# Patient Record
Sex: MALE | Race: WHITE | URBAN - METROPOLITAN AREA
[De-identification: names, ages, dates, MRNs, and addresses within clinical notes are randomized per-mention and may not be internally consistent; named-entity substitution may affect disease eponyms.]

---

## 2017-11-06 LAB
CREATININE, EXTERNAL: 0.74
LDL-C, EXTERNAL: 82

## 2018-02-08 ENCOUNTER — OFFICE VISIT (OUTPATIENT)
Dept: ENDOCRINOLOGY | Age: 53
End: 2018-02-08

## 2018-02-08 VITALS
SYSTOLIC BLOOD PRESSURE: 138 MMHG | TEMPERATURE: 96.4 F | WEIGHT: 295.6 LBS | HEIGHT: 76 IN | OXYGEN SATURATION: 100 % | RESPIRATION RATE: 18 BRPM | DIASTOLIC BLOOD PRESSURE: 93 MMHG | BODY MASS INDEX: 36 KG/M2 | HEART RATE: 74 BPM

## 2018-02-08 DIAGNOSIS — E11.65 TYPE 2 DIABETES MELLITUS WITH HYPERGLYCEMIA, UNSPECIFIED LONG TERM INSULIN USE STATUS: Primary | ICD-10-CM

## 2018-02-08 DIAGNOSIS — E78.2 MIXED HYPERLIPIDEMIA: ICD-10-CM

## 2018-02-08 DIAGNOSIS — E11.65 TYPE 2 DIABETES MELLITUS WITH HYPERGLYCEMIA, WITHOUT LONG-TERM CURRENT USE OF INSULIN (HCC): Primary | ICD-10-CM

## 2018-02-08 DIAGNOSIS — I10 HYPERTENSION, ESSENTIAL: ICD-10-CM

## 2018-02-08 LAB
GLUCOSE POC: 364 MG/DL
HBA1C MFR BLD HPLC: 11 %

## 2018-02-08 RX ORDER — ALLOPURINOL 100 MG/1
100 TABLET ORAL DAILY
COMMUNITY
End: 2019-07-17

## 2018-02-08 RX ORDER — METFORMIN HYDROCHLORIDE 500 MG/1
500 TABLET, EXTENDED RELEASE ORAL 2 TIMES DAILY WITH MEALS
Qty: 60 TAB | Refills: 6 | Status: SHIPPED | OUTPATIENT
Start: 2018-02-08 | End: 2018-03-22 | Stop reason: DRUGHIGH

## 2018-02-08 RX ORDER — HYDROCODONE BITARTRATE AND ACETAMINOPHEN 7.5; 325 MG/1; MG/1
TABLET ORAL
COMMUNITY
End: 2019-07-17

## 2018-02-08 RX ORDER — ASPIRIN 325 MG
81 TABLET ORAL DAILY
COMMUNITY
End: 2020-07-27

## 2018-02-08 RX ORDER — GLIPIZIDE 5 MG/1
TABLET ORAL 2 TIMES DAILY
COMMUNITY
End: 2018-08-03

## 2018-02-08 RX ORDER — VALSARTAN 160 MG/1
160 TABLET ORAL DAILY
COMMUNITY

## 2018-02-08 RX ORDER — ATORVASTATIN CALCIUM 40 MG/1
40 TABLET, FILM COATED ORAL
COMMUNITY

## 2018-02-08 RX ORDER — METOPROLOL TARTRATE 50 MG/1
50 TABLET ORAL DAILY
COMMUNITY
End: 2020-07-27 | Stop reason: ALTCHOICE

## 2018-02-08 RX ORDER — LISINOPRIL 10 MG/1
TABLET ORAL DAILY
COMMUNITY
End: 2019-07-17

## 2018-02-08 NOTE — PATIENT INSTRUCTIONS
Check blood sugars before  breakfast and at bedtime. Low blood glucose is less than 70     Maintain the log and bring it all your appointments    If the bedtime sugars are less than 100 ,eat a 15 gm snack. Humulin N 24 units in AM and 14 units at night     After a week Begin Metformin  mg at dinner and take for 2 weeks. If no GI side effects (nausea, diarrhea, belly pain),   Then take 1 tablet with breakfast and 1 tablet with dinner    Stop glipizide       Exercising for 30 minutes at least 5 days per week has been shown to increase the lifespan of diabetics. We encourage an active lifestyle that includes regular exercise. You may benefit from the [facility] at [facility] #[phone]     For diet information go to www. EATRIGHT. org     Diabetes is the leading cause of blindness in the [place] It is important that you see an eye doctor every year for a dilated retinal exam     Diabetes is the leading cause of amputations in the [place] It is very important that you keep an eye on the condition of you feet. Look for any cuts, calluses, ulcers, fungal infections, rashes, or nail problems. Diabetics need to be seen several times a year by their physician for fasting labs and monitoring of their diabetes. Prevention is the key to keeping diabetics out of trouble     Obtain a flu shot each Fall      What should you know about eating carbs? Managing the amount of carbohydrate (carbs) you eat is an important part of healthy meals when you have diabetes. Carbohydrate is found in many foods. · Learn which foods have carbs. And learn the amounts of carbs in different foods. · Bread, cereal, pasta, and rice have about 15 grams of carbs in a serving. A serving is 1 slice of bread (1 ounce), ½ cup of cooked cereal, or 1/3 cup of cooked pasta or rice. · Fruits have 15 grams of carbs in a serving.  A serving is 1 small fresh fruit, such as an apple or orange; ½ of a banana; ½ cup of cooked or canned fruit; ½ cup of fruit juice; 1 cup of melon or raspberries; or 2 tablespoons of dried fruit. · Milk and no-sugar-added yogurt have 15 grams of carbs in a serving. A serving is 1 cup of milk or 2/3 cup of no-sugar-added yogurt. · Starchy vegetables have 15 grams of carbs in a serving. A serving is ½ cup of mashed potatoes or sweet potato; 1 cup winter squash; ½ of a small baked potato; ½ cup of cooked beans; or ½ cup cooked corn or green peas. · Learn how much carbs to eat each day and at each meal. A dietitian or CDE can teach you how to keep track of the amount of carbs you eat. This is called carbohydrate counting. · If you are not sure how to count carbohydrate grams, use the Plate Method to plan meals. It is a good, quick way to make sure that you have a balanced meal. It also helps you spread carbs throughout the day. · Divide your plate by types of foods. Put non-starchy vegetables on half the plate, meat or other protein food on one-quarter of the plate, and a grain or starchy vegetable in the final quarter of the plate. To this you can add a small piece of fruit and 1 cup of milk or yogurt, depending on how many carbs you are supposed to eat at a meal.  · Try to eat about the same amount of carbs at each meal. Do not \"save up\" your daily allowance of carbs to eat at one meal.  · Proteins have very little or no carbs per serving. Examples of proteins are beef, chicken, turkey, fish, eggs, tofu, cheese, cottage cheese, and peanut butter. A serving size of meat is 3 ounces, which is about the size of a deck of cards. Examples of meat substitute serving sizes (equal to 1 ounce of meat) are 1/4 cup of cottage cheese, 1 egg, 1 tablespoon of peanut butter, and ½ cup of tofu. How can you eat out and still eat healthy? · Learn to estimate the serving sizes of foods that have carbohydrate. If you measure food at home, it will be easier to estimate the amount in a serving of restaurant food.   · If you eat more carbohydrate at a meal than you had planned, take a walk or do other exercise. This will help lower your blood sugar.

## 2018-02-08 NOTE — PROGRESS NOTES
Wt Readings from Last 3 Encounters:   02/08/18 295 lb 9.6 oz (134.1 kg)     Temp Readings from Last 3 Encounters:   02/08/18 96.4 °F (35.8 °C)     BP Readings from Last 3 Encounters:   02/08/18 (!) 138/93     Pulse Readings from Last 3 Encounters:   02/08/18 74   1. Have you been to the ER, urgent care clinic since your last visit? Hospitalized since your last visit? No    2. Have you seen or consulted any other health care providers outside of the [address] [place] [address] since your last visit? Include any pap smears or colon screening.  and Noel Hernandez for follow up.     Eye exam: 2017  Foot exam: No

## 2018-02-08 NOTE — MR AVS SNAPSHOT
[address] [facility] [address] 
[phone] Patient: Lenore Jackson MRN: [id number] :1965 Visit Information Date & Time Provider Department Dept. Phone Encounter #  
 2018 10:00 AM Quyen Ennis MD [facility] [phone] [id number] Follow-up Instructions Return in about 6 weeks (around 3/22/2018). Upcoming Health Maintenance Date Due DTaP/Tdap/Td series (1 - Tdap) 1/3/1988 FOBT Q 1 YEAR AGE 50-75 1/3/2017 Influenza Age 5 to Adult 2017 Allergies as of 2018  Review Complete On: 2018 By: Quyen Ennis MD  
  
 Severity Noted Reaction Type Reactions Lisinopril  2018    Cough Metformin  2018    Other (comments) Causes weakness and fatigue Current Immunizations  Never Reviewed No immunizations on file. Not reviewed this visit You Were Diagnosed With   
  
 Codes Comments Type 2 diabetes mellitus with hyperglycemia, without long-term current use of insulin (HCC)    -  Primary ICD-10-CM: E11.65 ICD-9-CM: 250.00, 790.29 Vitals BP Pulse Temp Resp Height(growth percentile) Weight(growth percentile) (!) 138/93 (BP 1 Location: Right arm, BP Patient Position: Sitting) 74 96.4 °F (35.8 °C) 18 6' 4\" (1.93 m) 295 lb 9.6 oz (134.1 kg) SpO2 BMI Smoking Status 100% 35.98 kg/m2 Never Smoker Vitals History BMI and BSA Data Body Mass Index Body Surface Area 35.98 kg/m 2 2.68 m 2 Your Updated Medication List  
  
   
This list is accurate as of: 18 11:17 AM.  Always use your most recent med list.  
  
  
  
  
 allopurinol 100 mg tablet Commonly known as:  Gillian Parker Take  by mouth daily. aspirin 325 mg tablet Generic drug:  aspirin Take 325 mg by mouth daily. atorvastatin 40 mg tablet Commonly known as:  LIPITOR Take  by mouth daily. glipiZIDE 5 mg tablet Commonly known as:  Bere Nestle Take  by mouth two (2) times a day. HYDROcodone-acetaminophen 7.5-325 mg per tablet Commonly known as:  Patsy  Take  by mouth. Take one tablet 3 times daily for pain as needed. lisinopril 10 mg tablet Commonly known as:  Hillary Iam Take  by mouth daily. metoprolol tartrate 25 mg tablet Commonly known as:  LOPRESSOR Take  by mouth daily. valsartan 160 mg tablet Commonly known as:  DIOVAN Take  by mouth daily. We Performed the Following AMB POC GLUCOSE, QUANTITATIVE, BLOOD [83612 CPT(R)] AMB POC HEMOGLOBIN A1C [45625 CPT(R)] Follow-up Instructions Return in about 6 weeks (around 3/22/2018). Patient Instructions Check blood sugars before  breakfast and at bedtime. Low blood glucose is less than 70 Maintain the log and bring it all your appointments If the bedtime sugars are less than 100 ,eat a 15 gm snack. Humulin N 24 units in AM and 14 units at night After a week Begin Metformin  mg at dinner and take for 2 weeks. If no GI side effects (nausea, diarrhea, belly pain), Then take 1 tablet with breakfast and 1 tablet with dinner Stop glipizide Exercising for 30 minutes at least 5 days per week has been shown to increase the lifespan of diabetics. We encourage an active lifestyle that includes regular exercise. You may benefit from the Diabetic Treatment Center at [facility] #[phone] For diet information go to www. EATRIGHT. org  
 
Diabetes is the leading cause of blindness in the [place] It is important that you see an eye doctor every year for a dilated retinal exam  
 
Diabetes is the leading cause of amputations in the [place] It is very important that you keep an eye on the condition of you feet. Look for any cuts, calluses, ulcers, fungal infections, rashes, or nail problems. Diabetics need to be seen several times a year by their physician for fasting labs and monitoring of their diabetes. Prevention is the key to keeping diabetics out of trouble Obtain a flu shot each Fall What should you know about eating carbs? Managing the amount of carbohydrate (carbs) you eat is an important part of healthy meals when you have diabetes. Carbohydrate is found in many foods. · Learn which foods have carbs. And learn the amounts of carbs in different foods. · Bread, cereal, pasta, and rice have about 15 grams of carbs in a serving. A serving is 1 slice of bread (1 ounce), ½ cup of cooked cereal, or 1/3 cup of cooked pasta or rice. · Fruits have 15 grams of carbs in a serving. A serving is 1 small fresh fruit, such as an apple or orange; ½ of a banana; ½ cup of cooked or canned fruit; ½ cup of fruit juice; 1 cup of melon or raspberries; or 2 tablespoons of dried fruit. · Milk and no-sugar-added yogurt have 15 grams of carbs in a serving. A serving is 1 cup of milk or 2/3 cup of no-sugar-added yogurt. · Starchy vegetables have 15 grams of carbs in a serving. A serving is ½ cup of mashed potatoes or sweet potato; 1 cup winter squash; ½ of a small baked potato; ½ cup of cooked beans; or ½ cup cooked corn or green peas. · Learn how much carbs to eat each day and at each meal. A dietitian or CDE can teach you how to keep track of the amount of carbs you eat. This is called carbohydrate counting. · If you are not sure how to count carbohydrate grams, use the Plate Method to plan meals. It is a good, quick way to make sure that you have a balanced meal. It also helps you spread carbs throughout the day. · Divide your plate by types of foods. Put non-starchy vegetables on half the plate, meat or other protein food on one-quarter of the plate, and a grain or starchy vegetable in the final quarter of the plate.  To this you can add a small piece of fruit and 1 cup of milk or yogurt, depending on how many carbs you are supposed to eat at a meal. 
· Try to eat about the same amount of carbs at each meal. Do not \"save up\" your daily allowance of carbs to eat at one meal. 
· Proteins have very little or no carbs per serving. Examples of proteins are beef, chicken, turkey, fish, eggs, tofu, cheese, cottage cheese, and peanut butter. A serving size of meat is 3 ounces, which is about the size of a deck of cards. Examples of meat substitute serving sizes (equal to 1 ounce of meat) are 1/4 cup of cottage cheese, 1 egg, 1 tablespoon of peanut butter, and ½ cup of tofu. How can you eat out and still eat healthy? · Learn to estimate the serving sizes of foods that have carbohydrate. If you measure food at home, it will be easier to estimate the amount in a serving of restaurant food. · If you eat more carbohydrate at a meal than you had planned, take a walk or do other exercise. This will help lower your blood sugar. Introducing \[facility] [place] [facility]\"" & HEALTH SERVICES! [facility] introduces [facility] patient portal. Now you can access parts of your medical record, email your doctor's office, and request medication refills online. 1. In your internet browser, go to [email or web address] 2. Click on the First Time User? Click Here link in the Sign In box. You will see the New Member Sign Up page. 3. Enter your [facility] Access Code exactly as it appears below. You will not need to use this code after youve completed the sign-up process. If you do not sign up before the expiration date, you must request a new code. · [facility] Access Code: [email or web address] Expires: 5/9/2018 11:17 AM 
 
4. Enter the last four digits of your Social Security Number (xxxx) and Date of Birth (mm/dd/yyyy) as indicated and click Submit. You will be taken to the next sign-up page. 5. Create a [facility] ID.  This will be your [facility] login ID and cannot be changed, so think of one that is secure and easy to remember. 6. Create a [facility] password. You can change your password at any time. 7. Enter your Password Reset Question and Answer. This can be used at a later time if you forget your password. 8. Enter your e-mail address. You will receive e-mail notification when new information is available in [address] 9. Click Sign Up. You can now view and download portions of your medical record. 10. Click the Download Summary menu link to download a portable copy of your medical information. If you have questions, please visit the Frequently Asked Questions section of the [facility] website. Remember, [facility] is NOT to be used for urgent needs. For medical emergencies, dial 911. Now available from your iPhone and Android! Please provide this summary of care documentation to your next provider. Your primary care clinician is listed as Donna Claude. If you have any questions after today's visit, please call [phone].

## 2018-02-08 NOTE — PROGRESS NOTES
[facility]               Rafiq Momin MD        [address] [address] NU:[phone] Fax [phone]               Patient Information  Date:2/8/2018  Name : Tammy Diehl 48 y.o.     YOB: 1965         Referred by: Filippo Rivas MD         Chief Complaint   Patient presents with    New Patient     Diabetes       History of Present Illness: Tammy Diehl is a 48 y.o. male here for initial visit of  Type 2 Diabetes Mellitus. Type 2 Diabetes was diagnosed 2017 . End organ effects of diabetes: peripheral neuropathy. He was told to be prediabetic several years ago, was prescribed metformin and it reportedly made him sick. He is on glipizide 5 mg twice daily. A1c in November was more than 10, today it is close to 15  Januvia was expensive, he was also given GLP-1 which he did not take it. No pancreatitis  History of cholectomy 12 inches, post diverticulitis  Was also on Actos, unclear etiology for discontinuation. He is checking blood glucose daily which is showing blood glucose ranging from 300s-400s. Has tingling numbness in the feet, polyuria. He also has hypogonadism and cardiologist discontinue testosterone replacement. He has DJD, scheduled for epidural injection next week. No chest pain or shortness of breath. Diet is very unhealthy, he is on a very high carbohydrate diet and potatoes are his weakness    No regular exercise due to arthritis    Wt Readings from Last 3 Encounters:   02/08/18 295 lb 9.6 oz (134.1 kg)       BP Readings from Last 3 Encounters:   02/08/18 (!) 138/93           Past Medical History:   Diagnosis Date    Arthritis     Diverticulitis     Gout     Heart attack 03/2017    Hypertension     Kidney stones     Type 2 diabetes mellitus (HCC)      Current Outpatient Prescriptions   Medication Sig    metoprolol tartrate (LOPRESSOR) 25 mg tablet Take  by mouth daily.     aspirin (ASPIRIN) 325 mg tablet Take 325 mg by mouth daily.  valsartan (DIOVAN) 160 mg tablet Take  by mouth daily.  atorvastatin (LIPITOR) 40 mg tablet Take  by mouth daily.  allopurinol (ZYLOPRIM) 100 mg tablet Take  by mouth daily.  lisinopril (PRINIVIL, ZESTRIL) 10 mg tablet Take  by mouth daily.  glipiZIDE (GLUCOTROL) 5 mg tablet Take  by mouth two (2) times a day.  HYDROcodone-acetaminophen (NORCO) 7.5-325 mg per tablet Take  by mouth. Take one tablet 3 times daily for pain as needed.  insulin NPH (NOVOLIN N, HUMULIN N) 100 unit/mL injection Inject 24 units in the morning and 14 units at night. Max daily dose 38 units.  metFORMIN ER (GLUCOPHAGE XR) 500 mg tablet Take 1 Tab by mouth two (2) times daily (with meals). No current facility-administered medications for this visit. Allergies   Allergen Reactions    Lisinopril Cough    Metformin Other (comments)     Causes weakness and fatigue       Review of Systems:  All 10 systems reviewed and are negative other than mentioned in HPI    Physical Examination:   Blood pressure (!) 138/93, pulse 74, temperature 96.4 °F (35.8 °C), resp. rate 18, height 6' 4\" (1.93 m), weight 295 lb 9.6 oz (134.1 kg), SpO2 100 %. Estimated body mass index is 35.98 kg/(m^2) as calculated from the following:    Height as of this encounter: 6' 4\" (1.93 m). -   Weight as of this encounter: 295 lb 9.6 oz (134.1 kg).   - General: pleasant, no distress, good eye contact  - HEENT: no pallor, no periorbital edema, EOMI  - Neck: supple, no thyromegaly, no nodules  - Cardiovascular: regular, normal rate, normal S1 and S2, no murmurs  - Respiratory: clear to auscultation bilaterally  - Gastrointestinal: soft, nontender, nondistended,  BS +  - Musculoskeletal: no proximal muscle weakness in upper or lower extremities  - Integumentary: no acanthosis nigricans,no edema,  - Neurological:,alert and oriented  - Psychiatric: normal mood and affect  - Skin: color, texture, turgor normal.     Diabetic foot exam: February 2018    Left:     Vibratory sensation decreased   Filament test normal sensation with micro filament   Pulse DP: 1+    Deformities: Callus  Right:    Vibratory sensation decreased   Filament test normal sensation with micro filament   Pulse DP: 1+   Deformities: Callus, hammertoes      Data Reviewed:     [] Glucose records reviewed. [] See glucose records for details (to be scanned). [] A1C  [] Reviewed labs    Creatinine 2017 normal    Assessment/Plan:     1. Type 2 diabetes mellitus with hyperglycemia, without long-term current use of insulin ([facility] [place] [address]    2. Hypertension, essential    3. Mixed hyperlipidemia        1. Type 2 Diabetes Mellitus with CVD  Lab Results   Component Value Date/Time    Hemoglobin A1c (POC) 11.0 02/08/2018 10:40 AM       He has poorly controlled diabetes, need to change lifestyle significantly. Discussed importance of carbohydrate portion control, examples given, portion size examples shown. He already has CVD, given arthritis his activity is very limited. GLP-1 agonist would be a better option but he has severe restrictions regarding medications. Given severe hyperglycemia start insulin, looks like he has a very high deductible plan  [employer], discussed the benefits of metformin, has insulin resistance, agreed to start a small dose  Absorption of oral medications might be an issue given colectomy, trial of extended release Metformin  Advised to check glucose 2 - 4 times daily    Diabetic issues reviewed : glycemic goals , written exchange diet given, low carbohydrate diet, weight control , home glucose monitoring emphasized,  hypoglycemia management and long term diabetic complications discussed. FLU annually ,Pneumovax ,aspirin daily,annual eye exam,microalbumin    2. HTN : Continue current therapy     3. Hyperlipidemia : Continue statin. 4.Obesity:Body mass index is 35.98 kg/(m^2). Discussed about the importance of exercise and carbohydrate portion control.     5. Peripheral neuropathy with callus  Foot care discussed,    Blood glucose is 400, advised against epidural glucocorticoid injections now at least temporarily. Follow-up Disposition:  Return in about 6 weeks (around 3/22/2018). Thank you for allowing me to participate in the care of this patient.     Crystal Wallace MD      Patient verbalized understanding

## 2018-03-22 ENCOUNTER — OFFICE VISIT (OUTPATIENT)
Dept: ENDOCRINOLOGY | Age: 53
End: 2018-03-22

## 2018-03-22 VITALS
BODY MASS INDEX: 35.01 KG/M2 | OXYGEN SATURATION: 98 % | WEIGHT: 287.5 LBS | HEART RATE: 70 BPM | RESPIRATION RATE: 14 BRPM | HEIGHT: 76 IN | SYSTOLIC BLOOD PRESSURE: 131 MMHG | DIASTOLIC BLOOD PRESSURE: 84 MMHG | TEMPERATURE: 96.3 F

## 2018-03-22 DIAGNOSIS — E11.65 TYPE 2 DIABETES MELLITUS WITH HYPERGLYCEMIA, UNSPECIFIED LONG TERM INSULIN USE STATUS: ICD-10-CM

## 2018-03-22 DIAGNOSIS — I10 HYPERTENSION, ESSENTIAL: ICD-10-CM

## 2018-03-22 DIAGNOSIS — E11.65 TYPE 2 DIABETES MELLITUS WITH HYPERGLYCEMIA, WITHOUT LONG-TERM CURRENT USE OF INSULIN (HCC): Primary | ICD-10-CM

## 2018-03-22 DIAGNOSIS — E78.2 MIXED HYPERLIPIDEMIA: ICD-10-CM

## 2018-03-22 RX ORDER — METFORMIN HYDROCHLORIDE 750 MG/1
750 TABLET, EXTENDED RELEASE ORAL 2 TIMES DAILY
Qty: 60 TAB | Refills: 11 | Status: SHIPPED | OUTPATIENT
Start: 2018-03-22 | End: 2018-08-03 | Stop reason: SDUPTHER

## 2018-03-22 NOTE — PROGRESS NOTES
Cori Luevano is a 48 y.o. male here for   Chief Complaint   Patient presents with    Diabetes    Cholesterol Problem    Hypertension       Functional glucose monitor and record keeping system? - yes  Eye exam within last year? [address] [place] exam within last year? - on file    1. Have you been to the ER, urgent care clinic since your last visit? Hospitalized since your last visit? -no    2. Have you seen or consulted any other health care providers outside of the [address] [place] [address] since your last visit?   Include any pap smears or colon screening.-no      Lab Results   Component Value Date/Time    Hemoglobin A1c (POC) 11.0 02/08/2018 10:40 AM       Wt Readings from Last 3 Encounters:   02/08/18 295 lb 9.6 oz (134.1 kg)     Temp Readings from Last 3 Encounters:   02/08/18 96.4 °F (35.8 °C)     BP Readings from Last 3 Encounters:   02/08/18 (!) 138/93     Pulse Readings from Last 3 Encounters:   02/08/18 74       Order placed for pt per verbal order with read back from Dr. Val Macias 03/22/18

## 2018-03-22 NOTE — MR AVS SNAPSHOT
[address] [facility] [address] 
[phone] Patient: Lenore Jackson MRN: [id number] :1965 Visit Information Date & Time Provider Department Dept. Phone Encounter #  
 3/22/2018  9:00 AM Quyen Ennis MD [facility] Endocrinology [phone] [id number] Follow-up Instructions Return in about 4 months (around 2018). Upcoming Health Maintenance Date Due MICROALBUMIN Q1 1/3/1977 EYE EXAM RETINAL OR DILATED Q1 1/3/1977 Pneumococcal 19-64 Medium Risk (1 of 1 - PPSV23) 1/3/1986 DTaP/Tdap/Td series (1 - Tdap) 1/3/1988 FOBT Q 1 YEAR AGE 50-75 1/3/2017 Influenza Age 5 to Adult 2017 HEMOGLOBIN A1C Q6M 2018 LIPID PANEL Q1 2018 FOOT EXAM Q1 2019 Allergies as of 3/22/2018  Review Complete On: 3/22/2018 By: Quyen Ennis MD  
  
 Severity Noted Reaction Type Reactions Lisinopril  2018    Cough Metformin  2018    Other (comments) Causes weakness and fatigue Current Immunizations  Never Reviewed No immunizations on file. Not reviewed this visit You Were Diagnosed With   
  
 Codes Comments Type 2 diabetes mellitus with hyperglycemia, without long-term current use of insulin (HCC)    -  Primary ICD-10-CM: E11.65 ICD-9-CM: 250.00, 790.29 Hypertension, essential     ICD-10-CM: I10 
ICD-9-CM: 401.9 Mixed hyperlipidemia     ICD-10-CM: E78.2 ICD-9-CM: 272.2 Vitals BP Pulse Temp Resp Height(growth percentile) Weight(growth percentile) 131/84 (BP 1 Location: Right arm, BP Patient Position: Sitting) 70 96.3 °F (35.7 °C) (Oral) 14 6' 4\" (1.93 m) 287 lb 8 oz (130.4 kg) SpO2 BMI Smoking Status 98% 35 kg/m2 Never Smoker Vitals History BMI and BSA Data Body Mass Index Body Surface Area 35 kg/m 2 2.64 m 2 Preferred Pharmacy Pharmacy Name Phone [address] [phone] Your Updated Medication List  
  
   
This list is accurate as of 3/22/18  9:04 AM.  Always use your most recent med list.  
  
  
  
  
 allopurinol 100 mg tablet Commonly known as:  Chevy Smith Take 100 mg by mouth daily. aspirin 325 mg tablet Generic drug:  aspirin Take 325 mg by mouth daily. atorvastatin 40 mg tablet Commonly known as:  LIPITOR Take 40 mg by mouth daily. BRILINTA 90 mg tablet Generic drug:  ticagrelor Take 90 mg by mouth two (2) times a day. glipiZIDE 5 mg tablet Commonly known as:  Anita Campbell Take  by mouth two (2) times a day. HYDROcodone-acetaminophen 7.5-325 mg per tablet Commonly known as:  Natan Norlander Take  by mouth. Take one tablet 3 times daily for pain as needed. insulin  unit/mL injection Commonly known as:  Ijeoma Mainland Inject 24 units in the morning and 14 units at night. Max daily dose 38 units. lisinopril 10 mg tablet Commonly known as:  Haley Gatica Take  by mouth daily. metFORMIN  mg tablet Commonly known as:  GLUCOPHAGE XR Take 1 Tab by mouth two (2) times daily (with meals). metoprolol tartrate 25 mg tablet Commonly known as:  LOPRESSOR Take  by mouth daily. valsartan 160 mg tablet Commonly known as:  DIOVAN Take  by mouth daily. Follow-up Instructions Return in about 4 months (around 7/22/2018). Patient Instructions Check blood sugars before  breakfast and at bedtime. Low blood glucose is less than 70 Maintain the log and bring it all your appointments If the bedtime sugars are less than 100 ,eat a 15 gm snack. Novolin  N 34 units in AM and 24 units at night Metformin  mg twice a day Exercising for 30 minutes at least 5 days per week has been shown to increase the lifespan of diabetics. We encourage an active lifestyle that includes regular exercise. Introducing [facility]! [facility] introduces [employer] patient portal. Now you can access parts of your medical record, email your doctor's office, and request medication refills online. 1. In your internet browser, go to [email or web address] 2. Click on the First Time User? Click Here link in the Sign In box. You will see the New Member Sign Up page. 3. Enter your [employer] Access Code exactly as it appears below. You will not need to use this code after youve completed the sign-up process. If you do not sign up before the expiration date, you must request a new code. · [employer] Access Code: [email or web address] Expires: 5/9/2018 12:17 PM 
 
4. Enter the last four digits of your Social Security Number (xxxx) and Date of Birth (mm/dd/yyyy) as indicated and click Submit. You will be taken to the next sign-up page. 5. Create a [employer] ID. This will be your [employer] login ID and cannot be changed, so think of one that is secure and easy to remember. 6. Create a [employer] password. You can change your password at any time. 7. Enter your Password Reset Question and Answer. This can be used at a later time if you forget your password. 8. Enter your e-mail address. You will receive e-mail notification when new information is available in [address] 9. Click Sign Up. You can now view and download portions of your medical record. 10. Click the Download Summary menu link to download a portable copy of your medical information. If you have questions, please visit the Frequently Asked Questions section of the [employer] website. Remember, [employer] is NOT to be used for urgent needs. For medical emergencies, dial 911. Now available from your iPhone and Android! Please provide this summary of care documentation to your next provider. Your primary care clinician is listed as Dalia Jimenez. If you have any questions after today's visit, please call [phone].

## 2018-03-22 NOTE — PATIENT INSTRUCTIONS
Check blood sugars before  breakfast and at bedtime. Low blood glucose is less than 70     Maintain the log and bring it all your appointments    If the bedtime sugars are less than 100 ,eat a 15 gm snack. Novolin  N 34 units in AM and 24 units at night      Metformin  mg twice a day       Exercising for 30 minutes at least 5 days per week has been shown to increase the lifespan of diabetics. We encourage an active lifestyle that includes regular exercise.

## 2018-03-22 NOTE — PROGRESS NOTES
[facility] CARE DIABETES AND ENDOCRINOLOGY               Luisa Lutz MD        [address] [address] IC:[phone] Fax [phone]               Patient Information  Date:3/22/2018  Name : Alyssa Mitchell 48 y.o.     YOB: 1965         Referred by: Marshall Barajas MD         Chief Complaint   Patient presents with    Diabetes    Cholesterol Problem    Hypertension       History of Present Illness: Alyssa Mitchell is a 48 y.o. male here for follow-up of  Type 2 Diabetes Mellitus. Type 2 Diabetes was diagnosed  . End organ effects of diabetes: peripheral neuropathy. He was told to be prediabetic several years ago, was prescribed metformin and it reportedly made him sick. He is on glipizide 5 mg twice daily. Initially A1c was 12  Januvia was expensive, he was also given GLP-1 which he did not take it. No pancreatitis  History of cholectomy 12 inches, post diverticulitis  Was also on Actos, unclear etiology for discontinuation. He was started on insulin  Lost weight, change the diet  Tolerating metformin  No chest pain or shortness of breath. No regular exercise due to arthritis    Wt Readings from Last 3 Encounters:   18 287 lb 8 oz (130.4 kg)   18 295 lb 9.6 oz (134.1 kg)       BP Readings from Last 3 Encounters:   18 131/84   18 (!) 138/93           Past Medical History:   Diagnosis Date    Arthritis     Diverticulitis     Gout     Heart attack 2017    Hypertension     Kidney stones     Type 2 diabetes mellitus (HCC)      Current Outpatient Prescriptions   Medication Sig    ticagrelor (BRILINTA) 90 mg tablet Take 90 mg by mouth two (2) times a day.  metoprolol tartrate (LOPRESSOR) 25 mg tablet Take  by mouth daily.  HYDROcodone-acetaminophen (NORCO) 7.5-325 mg per tablet Take  by mouth. Take one tablet 3 times daily for pain as needed.  aspirin (ASPIRIN) 325 mg tablet Take 325 mg by mouth daily.     valsartan (DIOVAN) 160 mg tablet Take  by mouth daily.  atorvastatin (LIPITOR) 40 mg tablet Take 40 mg by mouth daily.  insulin NPH (NOVOLIN N, HUMULIN N) 100 unit/mL injection Inject 24 units in the morning and 14 units at night. Max daily dose 38 units.  metFORMIN ER (GLUCOPHAGE XR) 500 mg tablet Take 1 Tab by mouth two (2) times daily (with meals).  allopurinol (ZYLOPRIM) 100 mg tablet Take 100 mg by mouth daily.  lisinopril (PRINIVIL, ZESTRIL) 10 mg tablet Take  by mouth daily.  glipiZIDE (GLUCOTROL) 5 mg tablet Take  by mouth two (2) times a day. No current facility-administered medications for this visit. Allergies   Allergen Reactions    Lisinopril Cough    Metformin Other (comments)     Causes weakness and fatigue       Review of Systems:  All 10 systems reviewed and are negative other than mentioned in HPI    Physical Examination:   Blood pressure 131/84, pulse 70, temperature 96.3 °F (35.7 °C), temperature source Oral, resp. rate 14, height 6' 4\" (1.93 m), weight 287 lb 8 oz (130.4 kg), SpO2 98 %. Estimated body mass index is 35 kg/(m^2) as calculated from the following:    Height as of this encounter: 6' 4\" (1.93 m). -   Weight as of this encounter: 287 lb 8 oz (130.4 kg). - General: pleasant, no distress, good eye contact  - HEENT: no pallor, no periorbital edema, EOMI  - Neck: supple, no thyromegaly, no nodules  -   - Integumentary: no acanthosis nigricans,no edema,  - Neurological:,alert and oriented  - Psychiatric: normal mood and affect  - Skin: color, texture, turgor normal.     Diabetic foot exam: February 2018    Left:     Vibratory sensation decreased   Filament test normal sensation with micro filament   Pulse DP: 1+    Deformities: Callus  Right:    Vibratory sensation decreased   Filament test normal sensation with micro filament   Pulse DP: 1+   Deformities: Callus, hammertoes      Data Reviewed:     [] Glucose records reviewed.   [] See glucose records for details (to be scanned). [] A1C  [] Reviewed labs    Creatinine 2017 normal    Assessment/Plan:     1. Type 2 diabetes mellitus with hyperglycemia, without long-term current use of insulin ([facility] [address]    2. Hypertension, essential    3. Mixed hyperlipidemia        1. Type 2 Diabetes Mellitus with CVD  Lab Results   Component Value Date/Time    Hemoglobin A1c (POC) 11.0 02/08/2018 10:40 AM     Reviewed the log    He already has CVD, given arthritis his activity is very limited. GLP-1 agonist would be a better option but he has severe restrictions regarding medications. NPH, 34/24  Continue metformin  Absorption of oral medications might be an issue given colectomy,  Advised to check glucose 2 - 4 times daily    Diabetic issues reviewed : glycemic goals , written exchange diet given, low carbohydrate diet, weight control , home glucose monitoring emphasized,  hypoglycemia management and long term diabetic complications discussed. FLU annually ,Pneumovax ,aspirin daily,annual eye exam,microalbumin    2. HTN : Continue current therapy     3. Hyperlipidemia : Continue statin. 4.Obesity:Body mass index is 35 kg/(m^2). Discussed about the importance of exercise and carbohydrate portion control. 5.  Peripheral neuropathy with callus  Foot care discussed,          Follow-up Disposition: Not on File    Thank you for allowing me to participate in the care of this patient.     Ana Hinton MD      Patient verbalized understanding

## 2018-07-31 LAB
ALBUMIN SERPL-MCNC: 4.2 G/DL (ref 3.5–5.5)
ALBUMIN/CREAT UR: 12.9 MG/G CREAT (ref 0–30)
ALBUMIN/GLOB SERPL: 1.4 {RATIO} (ref 1.2–2.2)
ALP SERPL-CCNC: 116 IU/L (ref 39–117)
ALT SERPL-CCNC: 21 IU/L (ref 0–44)
AST SERPL-CCNC: 20 IU/L (ref 0–40)
BILIRUB SERPL-MCNC: 0.3 MG/DL (ref 0–1.2)
BUN SERPL-MCNC: 11 MG/DL (ref 6–24)
BUN/CREAT SERPL: 14 (ref 9–20)
CALCIUM SERPL-MCNC: 9.2 MG/DL (ref 8.7–10.2)
CHLORIDE SERPL-SCNC: 103 MMOL/L (ref 96–106)
CHOLEST SERPL-MCNC: 146 MG/DL (ref 100–199)
CO2 SERPL-SCNC: 19 MMOL/L (ref 20–29)
CREAT SERPL-MCNC: 0.77 MG/DL (ref 0.76–1.27)
CREAT UR-MCNC: 116.6 MG/DL
EST. AVERAGE GLUCOSE BLD GHB EST-MCNC: 203 MG/DL
GLOBULIN SER CALC-MCNC: 3 G/DL (ref 1.5–4.5)
GLUCOSE SERPL-MCNC: 175 MG/DL (ref 65–99)
HBA1C MFR BLD: 8.7 % (ref 4.8–5.6)
HDLC SERPL-MCNC: 39 MG/DL
INTERPRETATION, 910389: NORMAL
LDLC SERPL CALC-MCNC: 76 MG/DL (ref 0–99)
Lab: NORMAL
MICROALBUMIN UR-MCNC: 15 UG/ML
PLEASE NOTE:, 188601: NORMAL
POTASSIUM SERPL-SCNC: 4.7 MMOL/L (ref 3.5–5.2)
PROT SERPL-MCNC: 7.2 G/DL (ref 6–8.5)
SODIUM SERPL-SCNC: 140 MMOL/L (ref 134–144)
SPECIMEN STATUS REPORT, ROLRST: NORMAL
TRIGL SERPL-MCNC: 153 MG/DL (ref 0–149)
VLDLC SERPL CALC-MCNC: 31 MG/DL (ref 5–40)

## 2018-08-03 ENCOUNTER — OFFICE VISIT (OUTPATIENT)
Dept: ENDOCRINOLOGY | Age: 53
End: 2018-08-03

## 2018-08-03 VITALS
SYSTOLIC BLOOD PRESSURE: 118 MMHG | TEMPERATURE: 96.7 F | HEART RATE: 66 BPM | WEIGHT: 288.7 LBS | DIASTOLIC BLOOD PRESSURE: 77 MMHG | HEIGHT: 76 IN | OXYGEN SATURATION: 98 % | BODY MASS INDEX: 35.16 KG/M2 | RESPIRATION RATE: 16 BRPM

## 2018-08-03 DIAGNOSIS — E78.2 MIXED HYPERLIPIDEMIA: ICD-10-CM

## 2018-08-03 DIAGNOSIS — Z79.4 TYPE 2 DIABETES MELLITUS WITH HYPERGLYCEMIA, WITH LONG-TERM CURRENT USE OF INSULIN (HCC): Primary | ICD-10-CM

## 2018-08-03 DIAGNOSIS — I10 HYPERTENSION, ESSENTIAL: ICD-10-CM

## 2018-08-03 DIAGNOSIS — E11.65 TYPE 2 DIABETES MELLITUS WITH HYPERGLYCEMIA, WITH LONG-TERM CURRENT USE OF INSULIN (HCC): Primary | ICD-10-CM

## 2018-08-03 DIAGNOSIS — E11.65 TYPE 2 DIABETES MELLITUS WITH HYPERGLYCEMIA, WITHOUT LONG-TERM CURRENT USE OF INSULIN (HCC): Primary | ICD-10-CM

## 2018-08-03 RX ORDER — IRBESARTAN 300 MG/1
300 TABLET ORAL
COMMUNITY
End: 2019-07-17

## 2018-08-03 RX ORDER — METFORMIN HYDROCHLORIDE 750 MG/1
750 TABLET, EXTENDED RELEASE ORAL 2 TIMES DAILY
Qty: 60 TAB | Refills: 11 | Status: SHIPPED | OUTPATIENT
Start: 2018-08-03 | End: 2019-08-27 | Stop reason: SDUPTHER

## 2018-08-03 NOTE — PROGRESS NOTES
[facility] CARE DIABETES AND ENDOCRINOLOGY               Joe Meza MD        [address] [address] HF:[phone] Fax [phone]               Patient Information  Date:8/3/2018  Name : Marek Moncada 48 y.o.     YOB: 1965         Referred by: Cristino Maharaj MD         Chief Complaint   Patient presents with    Diabetes       History of Present Illness: Marek Moncada is a 48 y.o. male here for follow-up of  Type 2 Diabetes Mellitus. Type 2 Diabetes was diagnosed 2017 . End organ effects of diabetes: peripheral neuropathy. He was told to be prediabetic several years ago, was prescribed metformin and it reportedly made him sick. He is on glipizide 5 mg twice daily. Initially A1c was 12 is improved to 8.7  Taking insulin consistently   -Limited activity  Januvia was expensive,  History of cholectomy 12 inches, post diverticulitis  Was also on Actos, unclear etiology for discontinuation. Tolerating metformin  No chest pain or shortness of breath. No regular exercise due to arthritis    Wt Readings from Last 3 Encounters:   08/03/18 288 lb 11.2 oz (131 kg)   03/22/18 287 lb 8 oz (130.4 kg)   02/08/18 295 lb 9.6 oz (134.1 kg)       BP Readings from Last 3 Encounters:   08/03/18 118/77   03/22/18 131/84   02/08/18 (!) 138/93           Past Medical History:   Diagnosis Date    Arthritis     Diverticulitis     Gout     Heart attack ([facility] [address] 03/2017    Hypertension     Kidney stones     Type 2 diabetes mellitus (HCC)      Current Outpatient Prescriptions   Medication Sig    irbesartan (AVAPRO) 300 mg tablet Take 300 mg by mouth nightly.  insulin NPH (NOVOLIN N, HUMULIN N) 100 unit/mL injection Inject 34 units in the morning and 24 units at night. Max daily dose 58 units.  metFORMIN ER (GLUCOPHAGE XR) 750 mg tablet Take 1 Tab by mouth two (2) times a day.  metoprolol tartrate (LOPRESSOR) 25 mg tablet Take  by mouth daily.     HYDROcodone-acetaminophen (NORCO) 7.5-325 mg per tablet Take  by mouth. Take one tablet 3 times daily for pain as needed.  aspirin (ASPIRIN) 325 mg tablet Take 325 mg by mouth daily.  atorvastatin (LIPITOR) 40 mg tablet Take 40 mg by mouth daily.  ticagrelor (BRILINTA) 90 mg tablet Take 90 mg by mouth two (2) times a day.  valsartan (DIOVAN) 160 mg tablet Take  by mouth daily.  allopurinol (ZYLOPRIM) 100 mg tablet Take 100 mg by mouth daily.  lisinopril (PRINIVIL, ZESTRIL) 10 mg tablet Take  by mouth daily.  glipiZIDE (GLUCOTROL) 5 mg tablet Take  by mouth two (2) times a day. No current facility-administered medications for this visit. Allergies   Allergen Reactions    Lisinopril Cough    Metformin Other (comments)     Causes weakness and fatigue       Review of Systems:  All 10 systems reviewed and are negative other than mentioned in HPI    Physical Examination:   Blood pressure 118/77, pulse 66, temperature 96.7 °F (35.9 °C), temperature source Oral, resp. rate 16, height 6' 4\" (1.93 m), weight 288 lb 11.2 oz (131 kg), SpO2 98 %. Estimated body mass index is 35.14 kg/(m^2) as calculated from the following:    Height as of this encounter: 6' 4\" (1.93 m). -   Weight as of this encounter: 288 lb 11.2 oz (131 kg). - General: pleasant, no distress, good eye contact  - HEENT: no pallor, no periorbital edema, EOMI  - Neck: supple, no thyromegaly, no nodules  -   - Integumentary: no acanthosis nigricans,no edema,  - Neurological:,alert and oriented  - Psychiatric: normal mood and affect  - Skin: color, texture, turgor normal.     Diabetic foot exam: February 2018    Left:     Vibratory sensation decreased   Filament test normal sensation with micro filament   Pulse DP: 1+    Deformities: Callus  Right:    Vibratory sensation decreased   Filament test normal sensation with micro filament   Pulse DP: 1+   Deformities: Callus, hammertoes      Data Reviewed:     [] Glucose records reviewed.   [] See glucose records for details (to be scanned). [] A1C  [] Reviewed labs    Creatinine  normal    Assessment/Plan:     No diagnosis found. 1. Type 2 Diabetes Mellitus with CVD  Lab Results   Component Value Date/Time    Hemoglobin A1c 8.7 (H) 2018 12:00 AM    Hemoglobin A1c (POC) 11.0 2018 10:40 AM     Improved control    He already has CVD, given arthritis his activity is very limited. GLP-1 agonist would be a better option -   Trial of Trulicity NPQ,  Continue metformin  Absorption of oral medications might be an issue given colectomy,  Advised to check glucose 2 - 4 times daily    FLU annually ,Pneumovax ,aspirin daily,annual eye exam,microalbumin    2. HTN : Continue current therapy     3. Hyperlipidemia : Continue statin. 4.Obesity:Body mass index is 35.14 kg/(m^2). Discussed about the importance of exercise and carbohydrate portion control. 5.  Peripheral neuropathy with callus  Foot care discussed,          Follow-up Disposition: Not on File    Thank you for allowing me to participate in the care of this patient.     Meme Barajas MD      Patient verbalized understanding

## 2018-08-03 NOTE — PROGRESS NOTES
Doris Osorio is a 48 y.o. male here for   Chief Complaint   Patient presents with    Diabetes       Functional glucose monitor and record keeping system? - yes forgot today  Eye exam within last year? - within last year  Foot exam within last year? - on file    1. Have you been to the ER, urgent care clinic since your last visit? Hospitalized since your last visit? - no    2. Have you seen or consulted any other health care providers outside of the [address] [place] [address] since your last visit?   Include any pap smears or colon screening.- no

## 2018-08-03 NOTE — PATIENT INSTRUCTIONS
Check blood sugars before  breakfast and at bedtime. Low blood glucose is less than 70     Maintain the log and bring it all your appointments    If the bedtime sugars are less than 100 ,eat a 15 gm snack. Novolin  N 34 units in AM and 34 units at night   When not  on Trulicity     When on Trulicity  Novolin N 24 units in AM and 24 units at night      Metformin  mg twice a day       Exercising for 30 minutes at least 5 days per week has been shown to increase the lifespan of diabetics. We encourage an active lifestyle that includes regular exercise.

## 2018-08-03 NOTE — MR AVS SNAPSHOT
[address] [facility] [address] 
[phone] Patient: Aashish Agarwal MRN: [id number] :1965 Visit Information Date & Time Provider Department Dept. Phone Encounter #  
 8/3/2018  8:45 AM Naa Sylvester MD [facility] Diabetes & Endocrinology [phone] [id number] Follow-up Instructions Return in about 4 months (around 12/3/2018). Upcoming Health Maintenance Date Due  
 EYE EXAM RETINAL OR DILATED Q1 1/3/1977 Pneumococcal 19-64 Medium Risk (1 of 1 - PPSV23) 1/3/1986 DTaP/Tdap/Td series (1 - Tdap) 1/3/1988 FOBT Q 1 YEAR AGE 50-75 1/3/2017 Influenza Age 5 to Adult 2018 HEMOGLOBIN A1C Q6M 2019 FOOT EXAM Q1 2019 MICROALBUMIN Q1 2019 LIPID PANEL Q1 2019 Allergies as of 8/3/2018  Review Complete On: 8/3/2018 By: Naa Sylvester MD  
  
 Severity Noted Reaction Type Reactions Lisinopril  2018    Cough Metformin  2018    Other (comments) Causes weakness and fatigue Current Immunizations  Never Reviewed No immunizations on file. Not reviewed this visit Vitals BP Pulse Temp Resp Height(growth percentile) Weight(growth percentile) 118/77 (BP 1 Location: Left arm, BP Patient Position: Sitting) 66 96.7 °F (35.9 °C) (Oral) 16 6' 4\" (1.93 m) 288 lb 11.2 oz (131 kg) SpO2 BMI Smoking Status 98% 35.14 kg/m2 Never Smoker Vitals History BMI and BSA Data Body Mass Index Body Surface Area  
 35.14 kg/m 2 2.65 m 2 Preferred Pharmacy Pharmacy Name Phone [facility] [address] [place] [address] [phone] Your Updated Medication List  
  
   
This list is accurate as of 8/3/18  9:20 AM.  Always use your most recent med list.  
  
  
  
  
 allopurinol 100 mg tablet Commonly known as:  Orpha Conquest Take 100 mg by mouth daily. aspirin 325 mg tablet Generic drug:  aspirin Take 325 mg by mouth daily. atorvastatin 40 mg tablet Commonly known as:  LIPITOR Take 40 mg by mouth daily. BRILINTA 90 mg tablet Generic drug:  ticagrelor Take 90 mg by mouth two (2) times a day. HYDROcodone-acetaminophen 7.5-325 mg per tablet Commonly known as:  [place] Take  by mouth. Take one tablet 3 times daily for pain as needed. insulin  unit/mL injection Commonly known as:  Tamea Primas Inject 34 units in the morning and 24 units at night. Max daily dose 58 units. irbesartan 300 mg tablet Commonly known as:  AVAPRO Take 300 mg by mouth nightly. lisinopril 10 mg tablet Commonly known as:  Mollie Ripa Take  by mouth daily. metFORMIN  mg tablet Commonly known as:  GLUCOPHAGE XR Take 1 Tab by mouth two (2) times a day. metoprolol tartrate 25 mg tablet Commonly known as:  LOPRESSOR Take  by mouth daily. valsartan 160 mg tablet Commonly known as:  DIOVAN Take  by mouth daily. Follow-up Instructions Return in about 4 months (around 12/3/2018). Patient Instructions Check blood sugars before  breakfast and at bedtime. Low blood glucose is less than 70 Maintain the log and bring it all your appointments If the bedtime sugars are less than 100 ,eat a 15 gm snack. Novolin  N 34 units in AM and 34 units at night   When not  on Trulicity When on Trulicity  Novolin N 24 units in AM and 24 units at night Metformin  mg twice a day Exercising for 30 minutes at least 5 days per week has been shown to increase the lifespan of diabetics. We encourage an active lifestyle that includes regular exercise. Introducing [facility]! [facility] introduces [employer] patient portal. Now you can access parts of your medical record, email your doctor's office, and request medication refills online. 1. In your internet browser, go to [email or web address] 2. Click on the First Time User? Click Here link in the Sign In box. You will see the New Member Sign Up page. 3. Enter your [employer] Access Code exactly as it appears below. You will not need to use this code after youve completed the sign-up process. If you do not sign up before the expiration date, you must request a new code. · [employer] Access Code: [email or web address] Expires: 11/1/2018  9:20 AM 
 
4. Enter the last four digits of your Social Security Number (xxxx) and Date of Birth (mm/dd/yyyy) as indicated and click Submit. You will be taken to the next sign-up page. 5. Create a [employer] ID. This will be your [employer] login ID and cannot be changed, so think of one that is secure and easy to remember. 6. Create a [employer] password. You can change your password at any time. 7. Enter your Password Reset Question and Answer. This can be used at a later time if you forget your password. 8. Enter your e-mail address. You will receive e-mail notification when new information is available in [address] 9. Click Sign Up. You can now view and download portions of your medical record. 10. Click the Download Summary menu link to download a portable copy of your medical information. If you have questions, please visit the Frequently Asked Questions section of the [employer] website. Remember, [employer] is NOT to be used for urgent needs. For medical emergencies, dial 911. Now available from your iPhone and Android! Please provide this summary of care documentation to your next provider. Your primary care clinician is listed as Joon Organ. If you have any questions after today's visit, please call [phone].

## 2018-11-29 ENCOUNTER — OFFICE VISIT (OUTPATIENT)
Dept: ENDOCRINOLOGY | Age: 53
End: 2018-11-29

## 2018-11-29 VITALS
WEIGHT: 288 LBS | TEMPERATURE: 96 F | DIASTOLIC BLOOD PRESSURE: 70 MMHG | BODY MASS INDEX: 35.07 KG/M2 | HEART RATE: 72 BPM | SYSTOLIC BLOOD PRESSURE: 112 MMHG | HEIGHT: 76 IN | RESPIRATION RATE: 16 BRPM | OXYGEN SATURATION: 96 %

## 2018-11-29 DIAGNOSIS — E78.2 MIXED HYPERLIPIDEMIA: ICD-10-CM

## 2018-11-29 DIAGNOSIS — E11.65 TYPE 2 DIABETES MELLITUS WITH HYPERGLYCEMIA, WITHOUT LONG-TERM CURRENT USE OF INSULIN (HCC): Primary | ICD-10-CM

## 2018-11-29 DIAGNOSIS — I10 HYPERTENSION, ESSENTIAL: ICD-10-CM

## 2018-11-29 RX ORDER — NAPROXEN 500 MG/1
500 TABLET ORAL AS NEEDED
COMMUNITY
End: 2020-07-27

## 2018-11-29 NOTE — PROGRESS NOTES
Brunilda Hernandez is a 48 y.o. male here for   Chief Complaint   Patient presents with    Diabetes       Functional glucose monitor and record keeping system? - yes  Eye exam within last year? - eye center   Foot exam within last year? - on file    1. Have you been to the ER, urgent care clinic since your last visit? Hospitalized since your last visit? -no    2. Have you seen or consulted any other health care providers outside of the [address] [place] [address] since your last visit? Include any pap smears or colon screening. -PCP

## 2018-11-29 NOTE — PROGRESS NOTES
[facility] DIABETES AND ENDOCRINOLOGY               Fabiola Almeida MD        [address] [address] CF:[phone] Fax [phone]               Patient Information  Date:11/29/2018  Name : Jatin Carney 48 y.o.     YOB: 1965         Referred by: Betsey Dillard MD         Chief Complaint   Patient presents with    Diabetes       History of Present Illness: Jatin Carney is a 48 y.o. male here for follow-up of  Type 2 Diabetes Mellitus. Type 2 Diabetes was diagnosed 2017 . End organ effects of diabetes: peripheral neuropathy. He was told to be prediabetic several years ago, was prescribed metformin and it reportedly made him sick. He is on glipizide 5 mg twice daily. Taking insulin consistently   -Limited activity  Tolerating the medications  Coyuvia was expensive,  History of colectomy 12 inches, post diverticulitis  Was also on Actos, unclear etiology for discontinuation. Tolerating metformin  No chest pain or shortness of breath. No regular exercise due to arthritis    Wt Readings from Last 3 Encounters:   11/29/18 288 lb (130.6 kg)   08/03/18 288 lb 11.2 oz (131 kg)   03/22/18 287 lb 8 oz (130.4 kg)       BP Readings from Last 3 Encounters:   11/29/18 112/70   08/03/18 118/77   03/22/18 131/84           Past Medical History:   Diagnosis Date    Arthritis     Diverticulitis     Gout     Heart attack ([facility] [address] 03/2017    Hypertension     Kidney stones     Type 2 diabetes mellitus (HCC)      Current Outpatient Medications   Medication Sig    naproxen (NAPROSYN) 500 mg tablet Take 500 mg by mouth as needed.  irbesartan (AVAPRO) 300 mg tablet Take 300 mg by mouth nightly.  metFORMIN ER (GLUCOPHAGE XR) 750 mg tablet Take 1 Tab by mouth two (2) times a day.  insulin NPH (NOVOLIN N, HUMULIN N) 100 unit/mL injection Inject 34 units in the morning and 24 units at night. Max daily dose 58 units.     metoprolol tartrate (LOPRESSOR) 25 mg tablet Take 25 mg by mouth daily.  HYDROcodone-acetaminophen (NORCO) 7.5-325 mg per tablet Take  by mouth. Take one tablet 3 times daily for pain as needed.  aspirin (ASPIRIN) 325 mg tablet Take 81 mg by mouth daily.  atorvastatin (LIPITOR) 40 mg tablet Take 40 mg by mouth daily.  dulaglutide (TRULICITY) 1.5 GK/5.1 mL sub-q pen 0.5 mL by SubCUTAneous route every seven (7) days. Stop 0.75 mg    ticagrelor (BRILINTA) 90 mg tablet Take 90 mg by mouth two (2) times a day.  valsartan (DIOVAN) 160 mg tablet Take  by mouth daily.  allopurinol (ZYLOPRIM) 100 mg tablet Take 100 mg by mouth daily.  lisinopril (PRINIVIL, ZESTRIL) 10 mg tablet Take  by mouth daily. No current facility-administered medications for this visit. Allergies   Allergen Reactions    Lisinopril Cough    Metformin Other (comments)     Causes weakness and fatigue       Review of Systems:  All 10 systems reviewed and are negative other than mentioned in HPI    Physical Examination:   Blood pressure 112/70, pulse 72, temperature 96 °F (35.6 °C), temperature source Oral, resp. rate 16, height 6' 4\" (1.93 m), weight 288 lb (130.6 kg), SpO2 96 %. Estimated body mass index is 35.06 kg/m² as calculated from the following:    Height as of this encounter: 6' 4\" (1.93 m). -   Weight as of this encounter: 288 lb (130.6 kg).   - General: pleasant, no distress, good eye contact  - HEENT: no pallor, no periorbital edema, EOMI  - Neck: supple, no thyromegaly, no nodules  -   - Integumentary: no acanthosis nigricans,no edema,  - Neurological:,alert and oriented  - Psychiatric: normal mood and affect  - Skin: color, texture, turgor normal.     Diabetic foot exam: February 2018    Left:     Vibratory sensation decreased   Filament test normal sensation with micro filament   Pulse DP: 1+    Deformities: Callus  Right:    Vibratory sensation decreased   Filament test normal sensation with micro filament   Pulse DP: 1+   Deformities: Callus, kandi      Data Reviewed:     [] Glucose records reviewed. [] See glucose records for details (to be scanned). [] A1C  [] Reviewed labs    Creatinine 2017 normal    Assessment/Plan:     1. Type 2 diabetes mellitus with hyperglycemia, without long-term current use of insulin (HCC)        1. Type 2 Diabetes Mellitus with CVD  Lab Results   Component Value Date/Time    Hemoglobin A1c 8.7 (H) 07/30/2018 12:00 AM    Hemoglobin A1c (POC) 11.0 02/08/2018 10:40 AM     Improved control, last PCP visit A1c was 7.5    He already has CVD, given arthritis his activity is very limited. GLP-1 agonist would be a better option -   Trulicity PTG,35/62  Continue metformin  Absorption of oral medications might be an issue given colectomy,  Advised to check glucose 2 - 4 times daily    FLU annually ,Pneumovax ,aspirin daily,annual eye exam,microalbumin    2. HTN : Continue current therapy     3. Hyperlipidemia : Continue statin. 4.Obesity:Body mass index is 35.06 kg/m². Discussed about the importance of exercise and carbohydrate portion control. 5.  Peripheral neuropathy with callus  Foot care discussed,          Follow-up Disposition:  Return in about 4 months (around 3/29/2019). Thank you for allowing me to participate in the care of this patient.     Susie Sams MD      Patient verbalized understanding

## 2018-11-29 NOTE — PATIENT INSTRUCTIONS
Check blood sugars before  breakfast and at bedtime. Low blood glucose is less than 70     Maintain the log and bring it all your appointments    If the bedtime sugars are less than 100 ,eat a 15 gm snack. Novolin  N 34 units in AM and 34 units at night   When not  on Trulicity     When on Trulicity  Novolin N 24 units in AM and 24 units at night     If you have low sugars then back off insulin by 4 units at a time      Metformin  mg twice a day       Exercising for 30 minutes at least 5 days per week has been shown to increase the lifespan of diabetics. We encourage an active lifestyle that includes regular exercise.

## 2019-03-20 LAB
CREATININE, EXTERNAL: 0.83
HBA1C MFR BLD HPLC: 6.9 %
LDL-C, EXTERNAL: 61
MICROALBUMIN UR TEST STR-MCNC: 13.2 MG/DL

## 2019-04-08 DIAGNOSIS — E11.65 TYPE 2 DIABETES MELLITUS WITH HYPERGLYCEMIA, WITHOUT LONG-TERM CURRENT USE OF INSULIN (HCC): ICD-10-CM

## 2019-06-05 ENCOUNTER — OP HISTORICAL/CONVERTED ENCOUNTER (OUTPATIENT)
Dept: OTHER | Age: 54
End: 2019-06-05

## 2019-07-17 ENCOUNTER — OFFICE VISIT (OUTPATIENT)
Dept: ENDOCRINOLOGY | Age: 54
End: 2019-07-17

## 2019-07-17 VITALS
HEIGHT: 76 IN | HEART RATE: 70 BPM | BODY MASS INDEX: 34.22 KG/M2 | RESPIRATION RATE: 14 BRPM | WEIGHT: 281 LBS | SYSTOLIC BLOOD PRESSURE: 124 MMHG | DIASTOLIC BLOOD PRESSURE: 82 MMHG | OXYGEN SATURATION: 98 % | TEMPERATURE: 97.7 F

## 2019-07-17 DIAGNOSIS — E11.65 TYPE 2 DIABETES MELLITUS WITH HYPERGLYCEMIA, WITHOUT LONG-TERM CURRENT USE OF INSULIN (HCC): Primary | ICD-10-CM

## 2019-07-17 DIAGNOSIS — I10 HYPERTENSION, ESSENTIAL: ICD-10-CM

## 2019-07-17 DIAGNOSIS — E11.65 TYPE 2 DIABETES MELLITUS WITH HYPERGLYCEMIA, WITHOUT LONG-TERM CURRENT USE OF INSULIN (HCC): ICD-10-CM

## 2019-07-17 DIAGNOSIS — E78.2 MIXED HYPERLIPIDEMIA: ICD-10-CM

## 2019-07-17 NOTE — LETTER
7/17/19 Patient: Ivanna Qiu YOB: 1965 Date of Visit: 7/17/2019 Heather Jasso MD 
[address],[place] [address] VIA Facsimile: [phone] Dear Heather Jasso MD, Thank you for referring Mr. Ivanna Qiu to [facility] DIABETES & ENDOCRINOLOGY for evaluation. My notes for this consultation are attached. If you have questions, please do not hesitate to call me. I look forward to following your patient along with you. Sincerely, Luis Alfredo Dahl MD

## 2019-07-17 NOTE — PROGRESS NOTES
[facility] DIABETES AND ENDOCRINOLOGY               Mona Andrews MD        [address] [phone] Fax [phone]               Patient Information  Date:7/17/2019  Name : Rhonda Tomas 48 y.o.     YOB: 1965         Referred by: Pau Monroe MD         Chief Complaint   Patient presents with    Diabetes    Diabetic Foot Exam       History of Present Illness: Rhonda Tomas is a 48 y.o. male here for follow-up of  Type 2 Diabetes Mellitus. Type 2 Diabetes was diagnosed 2017 . End organ effects of diabetes: peripheral neuropathy. He was told to be prediabetic several years ago, was prescribed metformin and it reportedly made him sick. He is on glipizide 5 mg twice daily. Reportedly got 8 glucocorticoid injections  Has back pain, followed by Orthopedician    Taking insulin consistently   -Limited activity    History of colectomy 12 inches, post diverticulitis  Was also on Actos, unclear etiology for discontinuation. Tolerating metformin  No chest pain or shortness of breath. No regular exercise due to arthritis    Wt Readings from Last 3 Encounters:   07/17/19 281 lb (127.5 kg)   11/29/18 288 lb (130.6 kg)   08/03/18 288 lb 11.2 oz (131 kg)       BP Readings from Last 3 Encounters:   07/17/19 124/82   11/29/18 112/70   08/03/18 118/77           Past Medical History:   Diagnosis Date    Arthritis     Diverticulitis     Gout     Heart attack ([facility] [address] 03/2017    Hypertension     Kidney stones     Type 2 diabetes mellitus (HCC)      Current Outpatient Medications   Medication Sig    TRULICITY 1.5 DS/0.3 mL sub-q pen GIVE 0.5ML ONCE A WEEK    dulaglutide (TRULICITY) 1.5 FV/3.6 mL sub-q pen 0.5 mL by SubCUTAneous route every seven (7) days.  Stop 0.75 mg    insulin NPH (NOVOLIN N NPH U-100 INSULIN) 100 unit/mL injection INJECT 34 UNITS IN THE MORNING AND 24 UNITS IN THE EVENING (Patient taking differently: INJECT 24 UNITS IN THE MORNING AND 24 UNITS IN THE EVENING)    naproxen (NAPROSYN) 500 mg tablet Take 500 mg by mouth as needed.  metFORMIN ER (GLUCOPHAGE XR) 750 mg tablet Take 1 Tab by mouth two (2) times a day.  metoprolol tartrate (LOPRESSOR) 50 mg tablet Take 50 mg by mouth daily.  aspirin (ASPIRIN) 325 mg tablet Take 81 mg by mouth daily.  valsartan (DIOVAN) 160 mg tablet Take  by mouth daily.  atorvastatin (LIPITOR) 40 mg tablet Take 40 mg by mouth nightly. No current facility-administered medications for this visit. Allergies   Allergen Reactions    Lisinopril Cough    Metformin Other (comments)     Causes weakness and fatigue       Review of Systems:  All 10 systems reviewed and are negative other than mentioned in HPI    Physical Examination:   Blood pressure 124/82, pulse 70, temperature 97.7 °F (36.5 °C), temperature source Oral, resp. rate 14, height 6' 4\" (1.93 m), weight 281 lb (127.5 kg), SpO2 98 %. Estimated body mass index is 34.2 kg/m² as calculated from the following:    Height as of this encounter: 6' 4\" (1.93 m). -   Weight as of this encounter: 281 lb (127.5 kg). - General: pleasant, no distress, good eye contact  - HEENT: no pallor, no periorbital edema, EOMI  - Neck: supple, no thyromegaly, no nodules  -   - Integumentary: no acanthosis nigricans,no edema,  - Neurological:,alert and oriented  - Psychiatric: normal mood and affect  - Skin: color, texture, turgor normal.     Diabetic foot exam: July 2019    Left:     Vibratory sensation decreased   Filament test normal sensation with micro filament   Pulse DP: 1+    Deformities: Callus, valgus deformity  Right:    Vibratory sensation decreased   Filament test normal sensation with micro filament   Pulse DP: 1+   Deformities: Callus, hammertoes      Data Reviewed:         Assessment/Plan:     No diagnosis found.     1. Type 2 Diabetes Mellitus with CVD  Lab Results   Component Value Date/Time    Hemoglobin A1c 8.7 (H) 07/30/2018 12:00 AM    Hemoglobin A1c (POC) 11.0 02/08/2018 10:40 AM    Hemoglobin A1c, External 6.9 03/20/2019     A1c March 2019: 6.9    He already has CVD, given arthritis his activity is very limited. GLP-1 agonist would be a better option -   Trulicity DTV,97/29  Continue metformin  Absorption of oral medications might be an issue given colectomy,  Advised to check glucose 2 - 4 times daily  Stressed the importance of checking the blood glucose  FLU annually ,Pneumovax ,aspirin daily,annual eye exam,microalbumin    2. HTN : Continue current therapy     3. Hyperlipidemia : Continue statin. 4.Obesity:Body mass index is 34.2 kg/m². Discussed about the importance of exercise and carbohydrate portion control. 5.  Peripheral neuropathy with callus  Foot care discussed,    Risk for hypoglycemia is high      Follow-up and Dispositions    · Return in about 4 months (around 11/17/2019). Thank you for allowing me to participate in the care of this patient.     Musa De La Torre MD      Patient verbalized understanding

## 2019-07-17 NOTE — PROGRESS NOTES
Kwaku Ledesma is a 48 y.o. male here for   Chief Complaint   Patient presents with    Diabetes    Diabetic Foot Exam       Functional glucose monitor and record keeping system? - yes  Eye exam within last year? - Eye center   Foot exam within last year? - due    1. Have you been to the ER, urgent care clinic since your last visit? Hospitalized since your last visit? -no    2. Have you seen or consulted any other health care providers outside of the [address] [place] [address] since your last visit? Include any pap smears or colon screening. -PCP

## 2019-07-18 LAB
EST. AVERAGE GLUCOSE BLD GHB EST-MCNC: 148 MG/DL
HBA1C MFR BLD: 6.8 % (ref 4.8–5.6)

## 2019-08-09 ENCOUNTER — IP HISTORICAL/CONVERTED ENCOUNTER (OUTPATIENT)
Dept: OTHER | Age: 54
End: 2019-08-09

## 2019-11-13 ENCOUNTER — LAB ONLY (OUTPATIENT)
Dept: ENDOCRINOLOGY | Age: 54
End: 2019-11-13

## 2019-11-13 ENCOUNTER — HOSPITAL ENCOUNTER (OUTPATIENT)
Dept: LAB | Age: 54
Discharge: HOME | End: 2019-11-13

## 2019-11-13 DIAGNOSIS — E11.65 TYPE 2 DIABETES MELLITUS WITH HYPERGLYCEMIA, WITHOUT LONG-TERM CURRENT USE OF INSULIN (HCC): ICD-10-CM

## 2019-11-13 DIAGNOSIS — E78.2 MIXED HYPERLIPIDEMIA: ICD-10-CM

## 2019-11-13 DIAGNOSIS — E11.65 TYPE 2 DIABETES MELLITUS WITH HYPERGLYCEMIA, WITHOUT LONG-TERM CURRENT USE OF INSULIN (HCC): Primary | ICD-10-CM

## 2019-11-13 DIAGNOSIS — I10 HYPERTENSION, ESSENTIAL: ICD-10-CM

## 2019-11-13 LAB
EST. AVERAGE GLUCOSE BLD GHB EST-MCNC: 148 MG/DL
HBA1C MFR BLD: 6.8 % (ref 4.2–6.3)

## 2019-11-19 NOTE — PROGRESS NOTES
Michoacano Webster is a 48 y.o. male here for Chief Complaint Patient presents with  Diabetes Functional glucose monitor and record keeping system? -yes Eye exam within last year? - [facility] Foot exam within last year? - on file 1. Have you been to the ER, urgent care clinic since your last visit? Hospitalized since your last visit? -no 
 
2. Have you seen or consulted any other health care providers outside of the [address] [place] [address] since your last visit? Include any pap smears or colon screening. -PCP Order placed for pt per verbal order with read back from Dr. Maged Shelby 11/20/19

## 2019-11-20 ENCOUNTER — OFFICE VISIT (OUTPATIENT)
Dept: ENDOCRINOLOGY | Age: 54
End: 2019-11-20

## 2019-11-20 VITALS
OXYGEN SATURATION: 98 % | WEIGHT: 280 LBS | TEMPERATURE: 95.6 F | HEIGHT: 76 IN | HEART RATE: 71 BPM | SYSTOLIC BLOOD PRESSURE: 123 MMHG | BODY MASS INDEX: 34.1 KG/M2 | DIASTOLIC BLOOD PRESSURE: 76 MMHG | RESPIRATION RATE: 16 BRPM

## 2019-11-20 DIAGNOSIS — I10 HYPERTENSION, ESSENTIAL: ICD-10-CM

## 2019-11-20 DIAGNOSIS — E11.65 TYPE 2 DIABETES MELLITUS WITH HYPERGLYCEMIA, WITHOUT LONG-TERM CURRENT USE OF INSULIN (HCC): Primary | ICD-10-CM

## 2019-11-20 DIAGNOSIS — E78.2 MIXED HYPERLIPIDEMIA: ICD-10-CM

## 2019-11-20 RX ORDER — HYDROCODONE BITARTRATE AND ACETAMINOPHEN 7.5; 325 MG/1; MG/1
1 TABLET ORAL 3 TIMES DAILY
COMMUNITY
End: 2020-07-27

## 2019-11-20 RX ORDER — SPIRONOLACTONE 25 MG/1
25 TABLET ORAL DAILY
COMMUNITY
End: 2020-07-27

## 2019-11-20 RX ORDER — FUROSEMIDE 40 MG/1
40 TABLET ORAL DAILY
COMMUNITY

## 2019-11-20 RX ORDER — ALLOPURINOL 100 MG/1
100 TABLET ORAL DAILY
COMMUNITY

## 2019-11-20 NOTE — PROGRESS NOTES
[facility] DIABETES AND ENDOCRINOLOGY Jose Schwab MD 
 
    [address] [phone] Fax [phone] Patient Information Date:11/20/2019 Name : Yanni Ulloa 48 y.o.    
YOB: 1965 Referred by: Verena Marc MD  
 
 
 
Chief Complaint Patient presents with  Diabetes History of Present Illness: Yanni Ulloa is a 48 y.o. male here for follow-up of  Type 2 Diabetes Mellitus. Type 2 Diabetes was diagnosed 2017 . End organ effects of diabetes: peripheral neuropathy. Weight has been stable Has back pain, followed by Orthopedician Got steroid injection again Planning to have back surgery Taking insulin consistently 
 -Limited activity History of colectomy 12 inches, post diverticulitis Was also on Actos, unclear etiology for discontinuation. Tolerating metformin No chest pain or shortness of breath. No regular exercise due to arthritis Wt Readings from Last 3 Encounters:  
11/20/19 280 lb (127 kg) 07/17/19 281 lb (127.5 kg)  
11/29/18 288 lb (130.6 kg) BP Readings from Last 3 Encounters:  
11/20/19 123/76  
07/17/19 124/82  
11/29/18 112/70 Past Medical History:  
Diagnosis Date  Arthritis  Diverticulitis  Gout  Heart attack ([facility] [address] 03/2017  Hypertension  Kidney stones  Type 2 diabetes mellitus ([facility] Current Outpatient Medications Medication Sig  furosemide (LASIX) 40 mg tablet Take 40 mg by mouth daily.  allopurinol (ZYLOPRIM) 100 mg tablet Take 100 mg by mouth daily.  HYDROcodone-acetaminophen (NORCO) 7.5-325 mg per tablet Take 1 Tab by mouth three (3) times daily.  spironolactone (ALDACTONE) 25 mg tablet Take 25 mg by mouth daily.  metFORMIN ER (GLUCOPHAGE XR) 750 mg tablet TAKE 1 TABLET TWICE DAILY  dulaglutide (TRULICITY) 1.5 SA/7.5 mL sub-q pen Inject 1.5 mg once weekly  insulin NPH (NOVOLIN N NPH U-100 INSULIN) 100 unit/mL injection INJECT 24 units in AM and 24 units at night  naproxen (NAPROSYN) 500 mg tablet Take 500 mg by mouth as needed.  metoprolol tartrate (LOPRESSOR) 50 mg tablet Take 50 mg by mouth daily.  aspirin (ASPIRIN) 325 mg tablet Take 81 mg by mouth daily.  valsartan (DIOVAN) 160 mg tablet Take 160 mg by mouth daily.  atorvastatin (LIPITOR) 40 mg tablet Take 40 mg by mouth nightly. No current facility-administered medications for this visit. Allergies Allergen Reactions  Lisinopril Cough  Metformin Other (comments) Causes weakness and fatigue Review of Systems:  All 10 systems reviewed and are negative other than mentioned in HPI Physical Examination: 
 Blood pressure 123/76, pulse 71, temperature 95.6 °F (35.3 °C), temperature source Oral, resp. rate 16, height 6' 4\" (1.93 m), weight 280 lb (127 kg), SpO2 98 %. Estimated body mass index is 34.08 kg/m² as calculated from the following: 
  Height as of this encounter: 6' 4\" (1.93 m). -   Weight as of this encounter: 280 lb (127 kg). - General: pleasant, no distress, good eye contact 
- HEENT: no pallor, no periorbital edema, EOMI 
- Neck: supple, no thyromegaly, no nodules -  
- Integumentary: no acanthosis nigricans,no edema, 
- Neurological:,alert and oriented - Psychiatric: normal mood and affect 
- Skin: color, texture, turgor normal.  
 
Diabetic foot exam: July 2019 Left:   
 Vibratory sensation decreased Filament test normal sensation with micro filament Pulse DP: 1+ Deformities: Callus, valgus deformity Right:  
 Vibratory sensation decreased Filament test normal sensation with micro filament Pulse DP: 1+ Deformities: Callus, hammertoes Data Reviewed:  
 
 
 
Assessment/Plan: 1. Type 2 diabetes mellitus with hyperglycemia, without long-term current use of insulin ([facility]) 2. Hypertension, essential   
3. Mixed hyperlipidemia 1. Type 2 Diabetes Mellitus with CVD Lab Results Component Value Date/Time Hemoglobin A1c 6.8 (H) 11/13/2019 10:00 AM  
 Hemoglobin A1c (POC) 11.0 02/08/2018 10:40 AM  
 Hemoglobin A1c, External 6.9 03/20/2019 A1c March 2019: 6.9 A1c November 2019 6.8 He already has CVD, given arthritis his activity is very limited. Trulicity CQS,32/61 Continue metformin Absorption of oral medications might be an issue given colectomy, Advised to check glucose 2 - 4 times daily Stressed the importance of checking the blood glucose FLU annually ,Pneumovax ,aspirin daily,annual eye exam,microalbumin 2. HTN : Continue current therapy 3. Hyperlipidemia : Continue statin. 4.Obesity:Body mass index is 34.08 kg/m². Discussed about the importance of exercise and carbohydrate portion control. 5.  Peripheral neuropathy with callus Foot care discussed, Risk for hypoglycemia is high Thank you for allowing me to participate in the care of this patient. Josue Jewell MD 
 
 
Patient verbalized understanding

## 2020-02-18 ENCOUNTER — OP HISTORICAL/CONVERTED ENCOUNTER (OUTPATIENT)
Dept: OTHER | Age: 55
End: 2020-02-18

## 2020-02-25 ENCOUNTER — IP HISTORICAL/CONVERTED ENCOUNTER (OUTPATIENT)
Dept: OTHER | Age: 55
End: 2020-02-25

## 2020-07-09 DIAGNOSIS — Z79.4 TYPE 2 DIABETES MELLITUS WITH HYPERGLYCEMIA, WITH LONG-TERM CURRENT USE OF INSULIN (HCC): ICD-10-CM

## 2020-07-09 DIAGNOSIS — E11.65 TYPE 2 DIABETES MELLITUS WITH HYPERGLYCEMIA, WITH LONG-TERM CURRENT USE OF INSULIN (HCC): ICD-10-CM

## 2020-07-09 RX ORDER — METFORMIN HYDROCHLORIDE 750 MG/1
TABLET, EXTENDED RELEASE ORAL
Qty: 60 TAB | Refills: 5 | Status: SHIPPED | OUTPATIENT
Start: 2020-07-09

## 2020-07-12 DIAGNOSIS — E11.65 TYPE 2 DIABETES MELLITUS WITH HYPERGLYCEMIA, WITHOUT LONG-TERM CURRENT USE OF INSULIN (HCC): ICD-10-CM

## 2020-07-12 RX ORDER — DULAGLUTIDE 1.5 MG/.5ML
INJECTION, SOLUTION SUBCUTANEOUS
Qty: 2 ML | Refills: 0 | Status: SHIPPED | OUTPATIENT
Start: 2020-07-12

## 2020-07-22 DIAGNOSIS — E11.65 TYPE 2 DIABETES MELLITUS WITH HYPERGLYCEMIA, WITHOUT LONG-TERM CURRENT USE OF INSULIN (HCC): ICD-10-CM

## 2020-07-24 PROBLEM — I21.4 NSTEMI (NON-ST ELEVATED MYOCARDIAL INFARCTION) (HCC): Status: ACTIVE | Noted: 2017-03-28

## 2020-07-27 ENCOUNTER — OFFICE VISIT (OUTPATIENT)
Dept: ENDOCRINOLOGY | Age: 55
End: 2020-07-27

## 2020-07-27 VITALS
HEIGHT: 76 IN | BODY MASS INDEX: 33.61 KG/M2 | HEART RATE: 88 BPM | TEMPERATURE: 98.5 F | RESPIRATION RATE: 14 BRPM | SYSTOLIC BLOOD PRESSURE: 122 MMHG | DIASTOLIC BLOOD PRESSURE: 86 MMHG | WEIGHT: 276 LBS

## 2020-07-27 DIAGNOSIS — I10 HYPERTENSION, ESSENTIAL: ICD-10-CM

## 2020-07-27 DIAGNOSIS — E11.65 TYPE 2 DIABETES MELLITUS WITH HYPERGLYCEMIA, WITHOUT LONG-TERM CURRENT USE OF INSULIN (HCC): Primary | ICD-10-CM

## 2020-07-27 DIAGNOSIS — E78.2 MIXED HYPERLIPIDEMIA: ICD-10-CM

## 2020-07-27 RX ORDER — METOPROLOL SUCCINATE 100 MG/1
TABLET, EXTENDED RELEASE ORAL DAILY
COMMUNITY

## 2020-07-27 RX ORDER — COLCHICINE 0.6 MG/1
CAPSULE ORAL
COMMUNITY
Start: 2020-05-25 | End: 2020-07-27

## 2020-07-27 RX ORDER — ASPIRIN 81 MG/1
TABLET ORAL DAILY
COMMUNITY

## 2020-07-27 RX ORDER — GABAPENTIN 300 MG/1
300 CAPSULE ORAL
COMMUNITY

## 2020-07-27 RX ORDER — PREDNISONE 20 MG/1
40 TABLET ORAL AS NEEDED
COMMUNITY

## 2020-07-27 NOTE — PROGRESS NOTES
Wt Readings from Last 3 Encounters:  
07/27/20 276 lb (125.2 kg)  
11/20/19 280 lb (127 kg) 07/17/19 281 lb (127.5 kg) Temp Readings from Last 3 Encounters:  
07/27/20 98.5 °F (36.9 °C) (Oral)  
11/20/19 95.6 °F (35.3 °C) (Oral) 07/17/19 97.7 °F (36.5 °C) (Oral) BP Readings from Last 3 Encounters:  
07/27/20 122/86  
11/20/19 123/76  
07/17/19 124/82 Pulse Readings from Last 3 Encounters:  
07/27/20 88  
11/20/19 71  
07/17/19 70 Lab Results Component Value Date/Time Hemoglobin A1c 6.8 (H) 11/13/2019 10:00 AM  
 Hemoglobin A1c (POC) 11.0 02/08/2018 10:40 AM  
 Hemoglobin A1c, External 6.9 03/20/2019

## 2020-07-27 NOTE — LETTER
7/27/20 Patient: Socorro Bella YOB: 1965 Date of Visit: 7/27/2020 Juanjo Quiroz MD 
[address],[place] [address] VIA Facsimile: [phone] Dear Juanjo Quiroz MD, Thank you for referring Mr. Socorro Bella to [facility] DIABETES & ENDOCRINOLOGY for evaluation. My notes for this consultation are attached. If you have questions, please do not hesitate to call me. I look forward to following your patient along with you. Sincerely, Jorge Nicole MD

## 2020-07-27 NOTE — PATIENT INSTRUCTIONS
Check blood sugars before  breakfast and at bedtime. Low blood glucose is less than 70 Maintain the log and bring it all your appointments If the bedtime sugars are less than 100 ,eat a 15 gm snack. Novolin  N 34 units in AM and 34 units at night   When not  on Trulicity Metformin  mg twice a day Check sugars before supper and if >150 then increase morning insulin to 34 units and keep the same at night Exercising for 30 minutes at least 5 days per week has been shown to increase the lifespan of diabetics. We encourage an active lifestyle that includes regular exercise.

## 2020-07-27 NOTE — PROGRESS NOTES
[facility] Leti Virk MD 
 
 
   
 
 
 
 
Patient Information Date:7/27/2020 Name : Harmony Tejeda 50 y.o.    
YOB: 1965 Referred by: Karina Solano MD  
 
 
 
Chief Complaint Patient presents with  Diabetes History of Present Illness: Harmony Tejeda is a 50 y.o. male here for follow-up of  Type 2 Diabetes Mellitus. Type 2 Diabetes was diagnosed 2017 . End organ effects of diabetes: peripheral neuropathy. Weight has been stable Has back pain, followed by Orthopedician, surgery in February 2020 Multiple doses of prednisone for gout, He is checking the blood glucose in the morning Fastings are ranging from 120-200 Taking insulin consistently Has pain Very limited activity No change in the diet Recent A1c according to the patient was 8, prior to that was 6.8 History of colectomy 12 inches, post diverticulitis Was also on Actos, unclear etiology for discontinuation. Tolerating metformin No chest pain or shortness of breath. No regular exercise due to arthritis Wt Readings from Last 3 Encounters:  
07/27/20 276 lb (125.2 kg)  
11/20/19 280 lb (127 kg) 07/17/19 281 lb (127.5 kg) BP Readings from Last 3 Encounters:  
07/27/20 122/86  
11/20/19 123/76  
07/17/19 124/82 Past Medical History:  
Diagnosis Date  Arthritis  Diverticulitis  Gout  Heart attack ([facility] [address] 03/2017  Hypertension  Kidney stones  Type 2 diabetes mellitus ([facility] [address] Current Outpatient Medications Medication Sig  
 aspirin delayed-release 81 mg tablet Take  by mouth daily.  predniSONE (DELTASONE) 20 mg tablet Take 40 mg by mouth as needed.  gabapentin (NEURONTIN) 300 mg capsule Take 300 mg by mouth nightly.  metoprolol succinate (TOPROL-XL) 100 mg tablet Take  by mouth daily.   
 insulin NPH (NovoLIN N NPH U-100 Insulin) 100 unit/mL injection INJECT 34 UNITS IN THE MORNING AND 24 UNITS IN THE EVENING  
 dulaglutide (Trulicity) 1.5 KQ/1.4 mL sub-q pen INJECT 1.5MG SUBQ ONCE A WEEK  
 metFORMIN ER (GLUCOPHAGE XR) 750 mg tablet TAKE 1 TABLET TWICE DAILY  furosemide (LASIX) 40 mg tablet Take 40 mg by mouth daily.  allopurinol (ZYLOPRIM) 100 mg tablet Take 100 mg by mouth daily.  valsartan (DIOVAN) 160 mg tablet Take 160 mg by mouth daily.  atorvastatin (LIPITOR) 40 mg tablet Take 40 mg by mouth nightly. No current facility-administered medications for this visit. Allergies Allergen Reactions  Lisinopril Cough  Metformin Other (comments) Causes weakness and fatigue Review of Systems:  All 10 systems reviewed and are negative other than mentioned in HPI Physical Examination: 
 Blood pressure 122/86, pulse 88, temperature 98.5 °F (36.9 °C), temperature source Oral, resp. rate 14, height 6' 4\" (1.93 m), weight 276 lb (125.2 kg). Estimated body mass index is 33.6 kg/m² as calculated from the following: 
  Height as of this encounter: 6' 4\" (1.93 m). -   Weight as of this encounter: 276 lb (125.2 kg). - General: pleasant, no distress, good eye contact 
- HEENT: no pallor, no periorbital edema, EOMI 
- Neck: supple, no thyromegaly, no nodules 
- CVS: S1-S2 heard - Integumentary: No edema 
- Neurological:,alert and oriented - Psychiatric: normal mood and affect 
- Skin: color, texture, turgor normal.  
 
Diabetic foot exam: July 2019 Left:   
 Vibratory sensation decreased Filament test normal sensation with micro filament Pulse DP: 1+ Deformities: Callus, valgus deformity Right:  
 Vibratory sensation decreased Filament test normal sensation with micro filament Pulse DP: 1+ Deformities: Callus, hammertoes Data Reviewed:  
 
 
 
Assessment/Plan: 1. Type 2 diabetes mellitus with hyperglycemia, without long-term current use of insulin (HCC) 2. Hypertension, essential   
3. Mixed hyperlipidemia 1. Type 2 Diabetes Mellitus with CVD Lab Results Component Value Date/Time Hemoglobin A1c 6.8 (H) 11/13/2019 10:00 AM  
 Hemoglobin A1c (POC) 11.0 02/08/2018 10:40 AM  
 Hemoglobin A1c, External 6.9 03/20/2019 A1c March 2019: 6.9 A1c November 2019 6.8 July 2020 A1c 8 Glucocorticoids worsened diabetes control He already has CVD, given arthritis his activity is very limited. Jarvis CAOW,85/83 Continue metformin Absorption of oral medications might be an issue given colectomy, Advised to check glucose 2 - 4 times daily Stressed the importance of checking the blood glucose FLU annually ,Pneumovax ,aspirin daily,annual eye exam,microalbumin 2. HTN : Continue current therapy 3. Hyperlipidemia : Continue statin. 4.Obesity:Body mass index is 33.6 kg/m². Discussed about the importance of exercise and carbohydrate portion control. 5.  Peripheral neuropathy with callus Foot care discussed, Risk for hypoglycemia is high Follow-up and Dispositions · Return in about 3 months (around 10/27/2020) for labs before next visit and follow up. Thank you for allowing me to participate in the care of this patient. Dhara Jaramillo MD 
 
 
Patient verbalized understanding

## 2020-08-18 ENCOUNTER — OP HISTORICAL/CONVERTED ENCOUNTER (OUTPATIENT)
Dept: OTHER | Age: 55
End: 2020-08-18

## 2020-10-01 DIAGNOSIS — I10 HYPERTENSION, ESSENTIAL: ICD-10-CM

## 2020-10-01 DIAGNOSIS — E78.2 MIXED HYPERLIPIDEMIA: ICD-10-CM

## 2020-10-01 DIAGNOSIS — E11.65 TYPE 2 DIABETES MELLITUS WITH HYPERGLYCEMIA, WITHOUT LONG-TERM CURRENT USE OF INSULIN (HCC): ICD-10-CM
